# Patient Record
Sex: MALE | Race: WHITE | Employment: FULL TIME | URBAN - METROPOLITAN AREA
[De-identification: names, ages, dates, MRNs, and addresses within clinical notes are randomized per-mention and may not be internally consistent; named-entity substitution may affect disease eponyms.]

---

## 2018-06-12 ENCOUNTER — OFFICE VISIT (OUTPATIENT)
Dept: INTERNAL MEDICINE CLINIC | Age: 23
End: 2018-06-12

## 2018-06-12 VITALS
SYSTOLIC BLOOD PRESSURE: 100 MMHG | BODY MASS INDEX: 23.04 KG/M2 | TEMPERATURE: 98.4 F | RESPIRATION RATE: 16 BRPM | HEART RATE: 84 BPM | OXYGEN SATURATION: 97 % | WEIGHT: 152 LBS | HEIGHT: 68 IN | DIASTOLIC BLOOD PRESSURE: 62 MMHG

## 2018-06-12 DIAGNOSIS — F41.9 ANXIETY: ICD-10-CM

## 2018-06-12 DIAGNOSIS — M25.512 ACUTE PAIN OF LEFT SHOULDER: ICD-10-CM

## 2018-06-12 DIAGNOSIS — R63.4 WEIGHT LOSS: Primary | ICD-10-CM

## 2018-06-12 RX ORDER — DESVENLAFAXINE SUCCINATE 50 MG/1
TABLET, EXTENDED RELEASE ORAL
COMMUNITY
Start: 2018-06-10 | End: 2019-09-17

## 2018-06-12 RX ORDER — AMPHETAMINE SULFATE 10 MG/1
TABLET ORAL
Refills: 0 | COMMUNITY
Start: 2018-05-29 | End: 2019-09-17

## 2018-06-12 RX ORDER — CLONAZEPAM 0.5 MG/1
TABLET ORAL
COMMUNITY

## 2018-06-12 NOTE — MR AVS SNAPSHOT
Aysha Godfrey 
 
 
 [address] [place] [address] [facility] 
[phone] Patient: Mena Rothman MRN: [id number] UWD:[birth year] Visit Information Date & Time Provider Department Dept. Phone Encounter #  
 6/12/2018  1:15 PM Bella Joseph MD [facility] [address] [facility] [id number] Upcoming Health Maintenance Date Due Influenza Age 5 to Adult 8/1/2018 DTaP/Tdap/Td series (2 - Td) 7/26/2027 Allergies as of 6/12/2018  Review Complete On: 7/50/4432 By: Kong Heredia Severity Noted Reaction Type Reactions Other Medication  09/12/2014    Hives Patient reports macrolides Zithromax [Azithromycin]  11/12/2013    Rash Current Immunizations  Never Reviewed Name Date Tdap 7/26/2017 Not reviewed this visit You Were Diagnosed With   
  
 Codes Comments Weight loss    -  Primary ICD-10-CM: R63.4 ICD-9-CM: 783.21 Acute pain of left shoulder     ICD-10-CM: M25.512 ICD-9-CM: 719.41 Vitals BP Pulse Temp Resp Height(growth percentile) Weight(growth percentile) 100/62 (BP 1 Location: Left arm, BP Patient Position: Sitting) 84 98.4 °F (36.9 °C) (Oral) 16 5' 8\" (1.727 m) 152 lb (68.9 kg) SpO2 BMI Smoking Status 97% 23.11 kg/m2 Never Smoker Vitals History BMI and BSA Data Body Mass Index Body Surface Area  
 23.11 kg/m 2 1.82 m 2 Preferred Pharmacy Pharmacy Name Phone [id number] [address] ([place] [address]) [phone] Your Updated Medication List  
  
   
This list is accurate as of 6/12/18  2:11 PM.  Always use your most recent med list.  
  
  
  
  
 albuterol 90 mcg/actuation inhaler Commonly known as:  PROVENTIL HFA, VENTOLIN HFA, PROAIR HFA Take 1 Puff by inhalation every six (6) hours as needed for Wheezing. desvenlafaxine succinate 50 mg ER tablet Commonly known as:  PRISTIQ  
  
 EVEKEO 10 mg Tab Generic drug:  amphetamine sulfate TAKE 1 TABLET BY MOUTH EVERY DAY KlonoPIN 0.5 mg tablet Generic drug:  clonazePAM  
Take  by mouth nightly as needed. SUMAVEL DOSEPRO 6 mg/0.5 mL Nfij Generic drug:  SUMAtriptan Succinate  
1 SQ injection qd prn  
  
 triamcinolone acetonide 0.1 % ointment Commonly known as:  KENALOG Apply  to affected area two (2) times a day. use thin layer ZyrTEC 10 mg tablet Generic drug:  cetirizine Take  by mouth daily. We Performed the Following METABOLIC PANEL, COMPREHENSIVE [69698 CPT(R)]   
 T3 TOTAL [86567 CPT(R)] T4, FREE [id number] CPT(R)] TSH 3RD GENERATION [85177 CPT(R)] Introducing [facility] SERVICES! Leia Smith introduces [employer] patient portal. Now you can access parts of your medical record, email your doctor's office, and request medication refills online. 1. In your internet browser, go to [email or web address] 2. Click on the First Time User? Click Here link in the Sign In box. You will see the New Member Sign Up page. 3. Enter your [employer] Access Code exactly as it appears below. You will not need to use this code after youve completed the sign-up process. If you do not sign up before the expiration date, you must request a new code. · [employer] Access Code: [email or web address] Expires: 9/10/2018  2:11 PM 
 
4. Enter the last four digits of your Social Security Number (xxxx) and Date of Birth (mm/dd/yyyy) as indicated and click Submit. You will be taken to the next sign-up page. 5. Create a [employer] ID. This will be your [employer] login ID and cannot be changed, so think of one that is secure and easy to remember. 6. Create a [employer] password. You can change your password at any time. 7. Enter your Password Reset Question and Answer. This can be used at a later time if you forget your password. 8. Enter your e-mail address. You will receive e-mail notification when new information is available in [address] 9. Click Sign Up. You can now view and download portions of your medical record. 10. Click the Download Summary menu link to download a portable copy of your medical information. If you have questions, please visit the Frequently Asked Questions section of the [employer] website. Remember, [employer] is NOT to be used for urgent needs. For medical emergencies, dial 911. Now available from your iPhone and Android! Please provide this summary of care documentation to your next provider. Your primary care clinician is listed as Monica Read. If you have any questions after today's visit, please call [phone].

## 2018-06-12 NOTE — PROGRESS NOTES
Macario Stewart is a 25 y.o. male who presents today for Thyroid Problem; Fatigue; and Shoulder Pain  . He has a history of   Patient Active Problem List   Diagnosis Code    Migraine with aura, without mention of intractable migraine without mention of status migrainosus G43.109   . Today patient is here for an acute visit. he does not have other concerns. Problem visit:  Macario Stewart is here for complaint of excessive hunger. .  Problem began 2 month(s) ago. Severity is moderate  Character of problem: feels like he cannot keep up his weight up without eating. Has been worse recently. Has been taking less Evekeo recently. Family history of ? Thyroid issue. No abd pain. Some increased stress. No C/D. Musculoskeletal pain:  He wishes to address discomfort in the Left shoulder at today's visit. This has been mild in nature, sudden, starting about 1 year ago in onset. Comes and goes and notices more when active. Sore to middle of shoulder. Pain then worse with use. Carries a bag on L shoulder. No trauma. Still seeing Dr. Samantha Thompson. Overall stress is up a bit, but anxiety under reasonable control. ROS  Review of Systems   Constitutional: Positive for weight loss. Negative for chills, fever and malaise/fatigue. HENT: Negative for ear discharge, ear pain, hearing loss, nosebleeds and tinnitus. Eyes: Negative for blurred vision and double vision. Respiratory: Negative for cough, hemoptysis, sputum production, shortness of breath and wheezing. Cardiovascular: Negative for chest pain, palpitations, orthopnea, claudication and leg swelling. Gastrointestinal: Negative for abdominal pain, blood in stool, constipation, diarrhea, heartburn, nausea and vomiting. Always hungry. Genitourinary: Negative for dysuria, frequency, hematuria and urgency. Musculoskeletal: Positive for joint pain. Negative for back pain, myalgias and neck pain.    Skin: Negative for itching. Neurological: Negative. Endo/Heme/Allergies: Does not bruise/bleed easily. Psychiatric/Behavioral: Negative for depression, hallucinations, memory loss, substance abuse and suicidal ideas. The patient is nervous/anxious and has insomnia. Visit Vitals    /62 (BP 1 Location: Left arm, BP Patient Position: Sitting)    Pulse 84    Temp 98.4 °F (36.9 °C) (Oral)    Resp 16    Ht 5' 8\" (1.727 m)    Wt 152 lb (68.9 kg)    SpO2 97%    BMI 23.11 kg/m2       Physical Exam   Constitutional: He is oriented to person, place, and time. He appears well-developed and well-nourished. HENT:   Head: Normocephalic and atraumatic. Eyes: EOM are normal. Pupils are equal, round, and reactive to light. Cardiovascular: Normal rate and regular rhythm. No murmur heard. Pulmonary/Chest: Effort normal and breath sounds normal. No respiratory distress. Musculoskeletal: He exhibits no edema. Normal ROM of shoulder. No pain with resisted rotation. Neurological: He is alert and oriented to person, place, and time. Skin: Skin is warm and dry. He is not diaphoretic. Psychiatric: He has a normal mood and affect. His behavior is normal.         Current Outpatient Prescriptions   Medication Sig    EVEKEO 10 mg tab TAKE 1 TABLET BY MOUTH EVERY DAY    desvenlafaxine succinate (PRISTIQ) 50 mg ER tablet     clonazePAM (KLONOPIN) 0.5 mg tablet Take  by mouth nightly as needed.  triamcinolone acetonide (KENALOG) 0.1 % ointment Apply  to affected area two (2) times a day. use thin layer    albuterol (PROVENTIL HFA, VENTOLIN HFA, PROAIR HFA) 90 mcg/actuation inhaler Take 1 Puff by inhalation every six (6) hours as needed for Wheezing.  SUMAVEL DOSEPRO 6 mg/0.5 mL nfIj 1 SQ injection qd prn    cetirizine (ZYRTEC) 10 mg tablet Take  by mouth daily. No current facility-administered medications for this visit.          Past Medical History:   Diagnosis Date    ADHD (attention deficit hyperactivity disorder)     Anxiety     AR (allergic rhinitis)     Asthma     Depression     Headache     N&V (nausea and vomiting)     Poor appetite     Visual disturbance       History reviewed. No pertinent surgical history. Social History   Substance Use Topics    Smoking status: Never Smoker    Smokeless tobacco: Never Used    Alcohol use No      Family History   Problem Relation Age of Onset    Headache Maternal Aunt     Headache Maternal Grandmother     Heart Disease Maternal Grandmother     Stroke Maternal Grandmother     Cancer Maternal Grandfather     Dementia Other         Allergies   Allergen Reactions    Other Medication Hives     Patient reports macrolides    Zithromax [Azithromycin] Rash        Assessment/Plan  Diagnoses and all orders for this visit:    1. Weight loss - weight here stable. + family history of thyroid issue. Notes having to eat more than he would expect. Will check thyroid. May also be due to less amphetamines recently.   -     TSH 3RD GENERATION  -     T4, FREE  -     T3 TOTAL  -     METABOLIC PANEL, COMPREHENSIVE    2. Acute pain of left shoulder - subacute. Likely overuse injury. Limit wearing bag on that side. PRN NSAIDs. 3. Anxiety - stable. Seeing psych.          Follow-up Disposition: Not on File    Ayde Martino MD  6/12/2018

## 2018-06-13 DIAGNOSIS — R74.01 ELEVATED ALT MEASUREMENT: Primary | ICD-10-CM

## 2018-06-13 LAB
ALBUMIN SERPL-MCNC: 5 G/DL (ref 3.5–5.5)
ALBUMIN/GLOB SERPL: 2.3 {RATIO} (ref 1.2–2.2)
ALP SERPL-CCNC: 74 IU/L (ref 39–117)
ALT SERPL-CCNC: 97 IU/L (ref 0–44)
AST SERPL-CCNC: 38 IU/L (ref 0–40)
BILIRUB SERPL-MCNC: 0.7 MG/DL (ref 0–1.2)
BUN SERPL-MCNC: 11 MG/DL (ref 6–20)
BUN/CREAT SERPL: 14 (ref 9–20)
CALCIUM SERPL-MCNC: 9.6 MG/DL (ref 8.7–10.2)
CHLORIDE SERPL-SCNC: 100 MMOL/L (ref 96–106)
CO2 SERPL-SCNC: 28 MMOL/L (ref 20–29)
CREAT SERPL-MCNC: 0.8 MG/DL (ref 0.76–1.27)
GFR SERPLBLD CREATININE-BSD FMLA CKD-EPI: 127 ML/MIN/1.73
GFR SERPLBLD CREATININE-BSD FMLA CKD-EPI: 147 ML/MIN/1.73
GLOBULIN SER CALC-MCNC: 2.2 G/DL (ref 1.5–4.5)
GLUCOSE SERPL-MCNC: 76 MG/DL (ref 65–99)
POTASSIUM SERPL-SCNC: 4.5 MMOL/L (ref 3.5–5.2)
PROT SERPL-MCNC: 7.2 G/DL (ref 6–8.5)
SODIUM SERPL-SCNC: 144 MMOL/L (ref 134–144)
T3 SERPL-MCNC: 114 NG/DL (ref 71–180)
T4 FREE SERPL-MCNC: 1.38 NG/DL (ref 0.82–1.77)
TSH SERPL DL<=0.005 MIU/L-ACNC: 1.93 UIU/ML (ref 0.45–4.5)

## 2018-07-10 ENCOUNTER — OFFICE VISIT (OUTPATIENT)
Dept: INTERNAL MEDICINE CLINIC | Age: 23
End: 2018-07-10

## 2018-07-10 VITALS
TEMPERATURE: 98.3 F | BODY MASS INDEX: 22.88 KG/M2 | RESPIRATION RATE: 16 BRPM | HEART RATE: 79 BPM | OXYGEN SATURATION: 97 % | WEIGHT: 151 LBS | SYSTOLIC BLOOD PRESSURE: 105 MMHG | DIASTOLIC BLOOD PRESSURE: 64 MMHG | HEIGHT: 68 IN

## 2018-07-10 DIAGNOSIS — J40 BRONCHITIS: Primary | ICD-10-CM

## 2018-07-10 RX ORDER — DOXYCYCLINE 100 MG/1
100 TABLET ORAL 2 TIMES DAILY
Qty: 14 TAB | Refills: 0 | Status: SHIPPED | OUTPATIENT
Start: 2018-07-10 | End: 2019-09-17

## 2018-07-10 NOTE — PROGRESS NOTES
HISTORY OF PRESENT ILLNESS  Víctor Crocker is a 25 y.o. male. HPI  Upper respiratory illness:  Víctor Crocker presents with complaints of sore throat, post nasal drip and cough described as productive of thick yellow-green sputum for 2 weeks. no nausea and no vomiting . he has not had  myalgias, fever, chills and shortness of breath. Symptoms are moderate. Over-the-counter remedies   has never been tried. Drinking plenty of fluids: yes  Asthma?:  no  non-smoker  Contacts with similar infections: yes -- his girlfriend was recently ill with similar symptoms and has improved with antibiotics        Review of Systems   Constitutional: Positive for malaise/fatigue. Negative for chills and fever. HENT: Positive for sore throat. Negative for congestion. Respiratory: Positive for cough and sputum production. Negative for shortness of breath and wheezing. Cardiovascular: Negative for chest pain and palpitations. Gastrointestinal: Negative for nausea and vomiting. Genitourinary: Negative for dysuria and frequency. Musculoskeletal: Negative for myalgias. Neurological: Negative for dizziness and headaches. /64 (BP 1 Location: Left arm, BP Patient Position: Sitting)  Pulse 79  Temp 98.3 °F (36.8 °C) (Oral)   Resp 16  Ht 5' 8\" (1.727 m)  Wt 151 lb (68.5 kg)  SpO2 97%  BMI 22.96 kg/m2  Physical Exam   Constitutional: He is oriented to person, place, and time. He appears well-developed and well-nourished. No distress. HENT:   Head: Normocephalic and atraumatic. Right Ear: External ear normal.   Left Ear: External ear normal.   Nose: Nose normal.   Mouth/Throat: Posterior oropharyngeal erythema (mild) present. Neck: Normal range of motion. Neck supple. No thyromegaly present. Cardiovascular: Normal rate and regular rhythm. Pulmonary/Chest: Effort normal. He has no wheezes. He has no rales.    Coarse upper airway congestion   Lymphadenopathy:     He has no cervical adenopathy. Neurological: He is alert and oriented to person, place, and time. Skin: Skin is warm and dry. Psychiatric: He has a normal mood and affect. His behavior is normal.   Nursing note and vitals reviewed. ASSESSMENT and PLAN  Diagnoses and all orders for this visit:    1. Bronchitis -- has been prolonged without improvement; cautioned regarding sensitivity to sun while on Doxycycline  -     doxycycline (ADOXA) 100 mg tablet; Take 1 Tab by mouth two (2) times a day. -     guaiFENesin-dextromethorphan SR (MUCINEX DM) 600-30 mg per tablet; Take 1 Tab by mouth every twelve (12) hours as needed for Cough.       reviewed diet, exercise and weight control  reviewed medications and side effects in detail

## 2018-07-10 NOTE — PATIENT INSTRUCTIONS
Bronchitis: Care Instructions  Your Care Instructions    Bronchitis is inflammation of the bronchial tubes, which carry air to the lungs. The tubes swell and produce mucus, or phlegm. The mucus and inflamed bronchial tubes make you cough. You may have trouble breathing. Most cases of bronchitis are caused by viruses like those that cause colds. Antibiotics usually do not help and they may be harmful. Bronchitis usually develops rapidly and lasts about 2 to 3 weeks in otherwise healthy people. Follow-up care is a key part of your treatment and safety. Be sure to make and go to all appointments, and call your doctor if you are having problems. It's also a good idea to know your test results and keep a list of the medicines you take. How can you care for yourself at home? · Take all medicines exactly as prescribed. Call your doctor if you think you are having a problem with your medicine. · Get some extra rest.  · Take an over-the-counter pain medicine, such as acetaminophen (Tylenol), ibuprofen (Advil, Motrin), or naproxen (Aleve) to reduce fever and relieve body aches. Read and follow all instructions on the label. · Do not take two or more pain medicines at the same time unless the doctor told you to. Many pain medicines have acetaminophen, which is Tylenol. Too much acetaminophen (Tylenol) can be harmful. · Take an over-the-counter cough medicine that contains dextromethorphan to help quiet a dry, hacking cough so that you can sleep. Avoid cough medicines that have more than one active ingredient. Read and follow all instructions on the label. · Breathe moist air from a humidifier, hot shower, or sink filled with hot water. The heat and moisture will thin mucus so you can cough it out. · Do not smoke. Smoking can make bronchitis worse. If you need help quitting, talk to your doctor about stop-smoking programs and medicines. These can increase your chances of quitting for good.   When should you call for help? Call 911 anytime you think you may need emergency care. For example, call if:    · You have severe trouble breathing.    Call your doctor now or seek immediate medical care if:    · You have new or worse trouble breathing.     · You cough up dark brown or bloody mucus (sputum).     · You have a new or higher fever.     · You have a new rash.    Watch closely for changes in your health, and be sure to contact your doctor if:    · You cough more deeply or more often, especially if you notice more mucus or a change in the color of your mucus.     · You are not getting better as expected. Where can you learn more? Go to http://salas-mira.info/. Enter H333 in the search box to learn more about \"Bronchitis: Care Instructions. \"  Current as of: December 6, 2017  Content Version: 11.7  © 3703-9223 [employer]. Care instructions adapted under license by [employer] (which disclaims liability or warranty for this information). If you have questions about a medical condition or this instruction, always ask your healthcare professional. [address] any warranty or liability for your use of this information.

## 2019-09-17 ENCOUNTER — OFFICE VISIT (OUTPATIENT)
Dept: INTERNAL MEDICINE CLINIC | Age: 24
End: 2019-09-17

## 2019-09-17 VITALS
BODY MASS INDEX: 22.28 KG/M2 | RESPIRATION RATE: 16 BRPM | OXYGEN SATURATION: 98 % | HEIGHT: 68 IN | HEART RATE: 88 BPM | WEIGHT: 147 LBS | SYSTOLIC BLOOD PRESSURE: 96 MMHG | TEMPERATURE: 98 F | DIASTOLIC BLOOD PRESSURE: 70 MMHG

## 2019-09-17 DIAGNOSIS — N41.0 ACUTE PROSTATITIS: Primary | ICD-10-CM

## 2019-09-17 DIAGNOSIS — R30.9 URINARY PAIN: ICD-10-CM

## 2019-09-17 LAB
BILIRUB UR QL STRIP: NEGATIVE
GLUCOSE UR-MCNC: NEGATIVE MG/DL
KETONES P FAST UR STRIP-MCNC: NEGATIVE MG/DL
PH UR STRIP: 8 [PH] (ref 4.6–8)
PROT UR QL STRIP: NORMAL
SP GR UR STRIP: 1.02 (ref 1–1.03)
UA UROBILINOGEN AMB POC: NORMAL (ref 0.2–1)
URINALYSIS CLARITY POC: CLEAR
URINALYSIS COLOR POC: YELLOW
URINE BLOOD POC: NEGATIVE
URINE LEUKOCYTES POC: NEGATIVE
URINE NITRITES POC: NEGATIVE

## 2019-09-17 RX ORDER — ATOMOXETINE 18 MG/1
18 CAPSULE ORAL DAILY
COMMUNITY

## 2019-09-17 RX ORDER — MIRTAZAPINE 7.5 MG/1
TABLET, FILM COATED ORAL
Refills: 0 | COMMUNITY
Start: 2019-09-08

## 2019-09-17 RX ORDER — FLUOXETINE HYDROCHLORIDE 40 MG/1
CAPSULE ORAL
Refills: 0 | COMMUNITY
Start: 2019-08-28

## 2019-09-17 RX ORDER — HYDROXYZINE 25 MG/1
TABLET, FILM COATED ORAL
Refills: 0 | COMMUNITY
Start: 2019-09-13

## 2019-09-17 NOTE — PROGRESS NOTES
Sylvia Foster is a 25 y.o. male who presents today for Urinary Burning and Anal Pain  . He has a history of   Patient Active Problem List   Diagnosis Code    Migraine with aura, without mention of intractable migraine without mention of status migrainosus G43.109   . Today patient is here for an acute visit. .     Problem visit:  Sylvia Foster is here for complaint of dysuria and rectal pain. Problem began 1 week(s) ago. Severity is moderate  Character of problem: Rectal pain and mild dyscomfort. Urinating/ejaculation makes the problem worse. She was one partner with whom he is monogamous. She does not have any symptoms. He denies any discharge. Denies any fevers or chills or other systemic signs of infection. He does note since last night this morning is been feeling much better and has not had any more symptoms. Urinalysis in the office only positive for protein. Denies any recent changes in medications. ROS  Review of Systems   Constitutional: Negative for chills, fever and weight loss. HENT: Negative for congestion and sore throat. Eyes: Negative for blurred vision, double vision and photophobia. Respiratory: Negative for cough and shortness of breath. Cardiovascular: Negative for chest pain, palpitations and leg swelling. Gastrointestinal: Negative for abdominal pain, constipation, diarrhea, heartburn, nausea and vomiting. Genitourinary: Positive for dysuria. Negative for frequency and urgency. Symptoms have resolved since yesterday. Musculoskeletal: Negative for joint pain and myalgias. Skin: Negative for rash. Neurological: Negative. Negative for headaches. Endo/Heme/Allergies: Does not bruise/bleed easily. Psychiatric/Behavioral: Negative for memory loss and suicidal ideas.        Visit Vitals  BP 96/70 (BP 1 Location: Left arm, BP Patient Position: Sitting)   Pulse 88   Temp 98 °F (36.7 °C) (Oral)   Resp 16   Ht 5' 8\" (1.727 m)   Wt 147 lb (66.7 kg)   SpO2 98%   BMI 22.35 kg/m²       Physical Exam   Constitutional: He is oriented to person, place, and time. He appears well-developed and well-nourished. HENT:   Head: Normocephalic and atraumatic. Right Ear: External ear normal.   Left Ear: External ear normal.   Eyes: Pupils are equal, round, and reactive to light. EOM are normal.   Neck: Normal range of motion. Neck supple. No JVD present. Cardiovascular: Normal rate and regular rhythm. No murmur heard. Pulmonary/Chest: Effort normal and breath sounds normal. No respiratory distress. He has no wheezes. Abdominal: Soft. Bowel sounds are normal. He exhibits no distension. There is no tenderness. Musculoskeletal: Normal range of motion. He exhibits no edema. Neurological: He is alert and oriented to person, place, and time. No cranial nerve deficit. Skin: Skin is warm and dry. He is not diaphoretic. Psychiatric: He has a normal mood and affect. His behavior is normal.         Current Outpatient Medications   Medication Sig    FLUoxetine (PROZAC) 40 mg capsule     hydrOXYzine HCl (ATARAX) 25 mg tablet take 1 tablet by mouth three times a day if needed for anxiety    mirtazapine (REMERON) 7.5 mg tablet     atomoxetine (STRATTERA) 18 mg capsule Take 18 mg by mouth daily.  clonazePAM (KLONOPIN) 0.5 mg tablet Take  by mouth nightly as needed.  albuterol (PROVENTIL HFA, VENTOLIN HFA, PROAIR HFA) 90 mcg/actuation inhaler Take 1 Puff by inhalation every six (6) hours as needed for Wheezing.  cetirizine (ZYRTEC) 10 mg tablet Take  by mouth daily. No current facility-administered medications for this visit. Past Medical History:   Diagnosis Date    ADHD (attention deficit hyperactivity disorder)     Anxiety     AR (allergic rhinitis)     Asthma     Depression     Headache     N&V (nausea and vomiting)     Poor appetite     Visual disturbance       History reviewed. No pertinent surgical history.    Social History Tobacco Use    Smoking status: Never Smoker    Smokeless tobacco: Never Used   Substance Use Topics    Alcohol use: No      Family History   Problem Relation Age of Onset    Headache Maternal Aunt     Headache Maternal Grandmother     Heart Disease Maternal Grandmother     Stroke Maternal Grandmother     Cancer Maternal Grandfather     Dementia Other         Allergies   Allergen Reactions    Other Medication Hives     Patient reports macrolides    Zithromax [Azithromycin] Rash        Assessment/Plan  Diagnoses and all orders for this visit:    1. Acute prostatitis -likely prostatitis have resolved. Will send urine for microscopy but his symptoms have resolved no further evaluation needed. If symptoms recur we will treat for prostatitis with Cipro. -     URINALYSIS W/ RFLX MICROSCOPIC    2. Urinary pain  -     AMB POC URINALYSIS DIP STICK AUTO W/O MICRO        Follow-up and Dispositions    · Return if symptoms worsen or fail to improve. Dwayne Beltran MD  9/17/2019    This note was created with the help of speech recognition software Ebonie Jennings) and may contain some 'sound alike' errors.

## 2019-09-17 NOTE — PATIENT INSTRUCTIONS
Prostatitis: Care Instructions  Your Care Instructions    The prostate gland is a small, walnut-shaped organ. It lies just below a man's bladder. It surrounds the urethra, the tube that carries urine through the penis and out of the body. Prostatitis is a painful condition caused by inflammation or infection of the prostate gland. Sometimes the condition is caused by bacteria, but often the cause is not known. Prostatitis caused by bacteria usually is treated with self-care and antibiotics. Follow-up care is a key part of your treatment and safety. Be sure to make and go to all appointments, and call your doctor if you are having problems. It's also a good idea to know your test results and keep a list of the medicines you take. How can you care for yourself at home? · If your doctor prescribed antibiotics, take them as directed. Do not stop taking them just because you feel better. You need to take the full course of antibiotics. · Take an over-the-counter pain medicine, such as acetaminophen (Tylenol), ibuprofen (Advil, Motrin), or naproxen (Aleve). Be safe with medicines. Read and follow all instructions on the label. · Take warm baths to help soothe pain. · Straining to pass stools can hurt when your prostate is inflamed. Avoid constipation. ? Include fruits, vegetables, beans, and whole grains in your diet each day. These foods are high in fiber. ? Drink plenty of fluids, enough so that your urine is light yellow or clear like water. If you have kidney, heart, or liver disease and have to limit fluids, talk with your doctor before you increase the amount of fluids you drink. ? Get some exercise every day. Build up slowly to 30 to 60 minutes a day on 5 or more days of the week. ? Take a fiber supplement, such as Citrucel or Metamucil, every day if needed. Read and follow all instructions on the label. ? Schedule time each day for a bowel movement. Having a daily routine may help.  Take your time and do not strain when having a bowel movement. · Avoid alcohol, caffeine, and spicy foods, especially if they make your symptoms worse. When should you call for help? Call your doctor now or seek immediate medical care if:    · You have symptoms of a urinary tract infection. These may include:  ? Pain or burning when you urinate. ? A frequent need to urinate without being able to pass much urine. ? Pain in the flank, which is just below the rib cage and above the waist on either side of the back. ? Blood in your urine. ? A fever.    Watch closely for changes in your health, and be sure to contact your doctor if:    · You cannot empty your bladder completely.     · You do not get better as expected. Where can you learn more? Go to http://salas-mira.info/. Enter J010 in the search box to learn more about \"Prostatitis: Care Instructions. \"  Current as of: September 26, 2018  Content Version: 12.1  © 6620-1360 [employer]. Care instructions adapted under license by [employer] (which disclaims liability or warranty for this information). If you have questions about a medical condition or this instruction, always ask your healthcare professional. [address] any warranty or liability for your use of this information.

## 2019-09-18 LAB
APPEARANCE UR: ABNORMAL
BACTERIA #/AREA URNS HPF: ABNORMAL /[HPF]
BILIRUB UR QL STRIP: NEGATIVE
CASTS URNS QL MICRO: ABNORMAL /LPF
COLOR UR: YELLOW
CRYSTALS URNS MICRO: ABNORMAL
EPI CELLS #/AREA URNS HPF: ABNORMAL /HPF (ref 0–10)
GLUCOSE UR QL: NEGATIVE
HGB UR QL STRIP: NEGATIVE
KETONES UR QL STRIP: NEGATIVE
LEUKOCYTE ESTERASE UR QL STRIP: NEGATIVE
MICRO URNS: ABNORMAL
MUCOUS THREADS URNS QL MICRO: PRESENT
NITRITE UR QL STRIP: NEGATIVE
PH UR STRIP: 8 [PH] (ref 5–7.5)
PROT UR QL STRIP: ABNORMAL
RBC #/AREA URNS HPF: ABNORMAL /HPF (ref 0–2)
SP GR UR: 1.02 (ref 1–1.03)
UNIDENT CRYS URNS QL MICRO: PRESENT
UROBILINOGEN UR STRIP-MCNC: 0.2 MG/DL (ref 0.2–1)
WBC #/AREA URNS HPF: ABNORMAL /HPF (ref 0–5)

## 2020-10-13 ENCOUNTER — OFFICE VISIT (OUTPATIENT)
Dept: INTERNAL MEDICINE CLINIC | Age: 25
End: 2020-10-13

## 2020-10-13 VITALS
DIASTOLIC BLOOD PRESSURE: 84 MMHG | BODY MASS INDEX: 27.95 KG/M2 | RESPIRATION RATE: 22 BRPM | OXYGEN SATURATION: 97 % | TEMPERATURE: 98.4 F | WEIGHT: 183.8 LBS | HEART RATE: 96 BPM | SYSTOLIC BLOOD PRESSURE: 127 MMHG

## 2020-10-13 DIAGNOSIS — F32.A ANXIETY AND DEPRESSION: ICD-10-CM

## 2020-10-13 DIAGNOSIS — R82.90 CLOUDY URINE: Primary | ICD-10-CM

## 2020-10-13 DIAGNOSIS — F98.8 ATTENTION DEFICIT DISORDER, UNSPECIFIED HYPERACTIVITY PRESENCE: ICD-10-CM

## 2020-10-13 DIAGNOSIS — F41.9 ANXIETY AND DEPRESSION: ICD-10-CM

## 2020-10-13 LAB
BILIRUB UR QL STRIP: NEGATIVE
GLUCOSE UR-MCNC: NEGATIVE MG/DL
KETONES P FAST UR STRIP-MCNC: NEGATIVE MG/DL
PH UR STRIP: 6 [PH] (ref 4.6–8)
PROT UR QL STRIP: NORMAL
SP GR UR STRIP: 1.03 (ref 1–1.03)
UA UROBILINOGEN AMB POC: NORMAL (ref 0.2–1)
URINALYSIS CLARITY POC: CLEAR
URINALYSIS COLOR POC: NORMAL
URINE BLOOD POC: NEGATIVE
URINE LEUKOCYTES POC: NEGATIVE
URINE NITRITES POC: NEGATIVE

## 2020-10-13 PROCEDURE — 81001 URINALYSIS AUTO W/SCOPE: CPT | Performed by: INTERNAL MEDICINE

## 2020-10-13 PROCEDURE — 99214 OFFICE O/P EST MOD 30 MIN: CPT | Performed by: INTERNAL MEDICINE

## 2020-10-13 NOTE — PROGRESS NOTES
HISTORY OF PRESENT ILLNESS  Taz Tompkins is a 22 y.o. male. HPI  Cloudy urine: Pt reports that 2 weeks ago he started having dark, odorous, cloudy urine with no dysuria. Pt notes that he has had mild inflammation of his prostate in the past. He states that he has decreased his caffeine intake. Pt notes that he is drinking more water. He reports that he is exercising more and increasing his protein intake. He notes decreased libido due to medication compliance. ADD: Stable, pt continues to comply with Remeron. Pt reports that he has increased appetite due to Remeron so he is taking it later at night to prevent bouts of hunger. Mood: Stable, pt continues to comply with Prozac. Review of Systems   Genitourinary: Negative for dysuria. Dark odorous urine   All other systems reviewed and are negative. Physical Exam  Vitals signs and nursing note reviewed. Constitutional:       Appearance: Normal appearance. He is normal weight. HENT:      Head: Normocephalic and atraumatic. Right Ear: Tympanic membrane, ear canal and external ear normal.      Left Ear: Tympanic membrane, ear canal and external ear normal.      Nose: Nose normal.      Mouth/Throat:      Mouth: Mucous membranes are dry. Pharynx: Oropharynx is clear. Neck:      Musculoskeletal: Normal range of motion and neck supple. Cardiovascular:      Rate and Rhythm: Normal rate and regular rhythm. Pulses: Normal pulses. Heart sounds: Normal heart sounds. Pulmonary:      Effort: Pulmonary effort is normal.      Breath sounds: Normal breath sounds. Abdominal:      General: Abdomen is flat. Palpations: Abdomen is soft. Musculoskeletal: Normal range of motion. Skin:     General: Skin is warm and dry. Neurological:      General: No focal deficit present. Mental Status: He is alert and oriented to person, place, and time. Mental status is at baseline.    Psychiatric:         Mood and Affect: Mood normal.         Behavior: Behavior normal.         Thought Content: Thought content normal.         Judgment: Judgment normal.         ASSESSMENT and PLAN  Diagnoses and all orders for this visit:    1. Cloudy urine  Informed pt that his urinalysis showed no nitrates or leukocytes. Assured pt that I do not suspect UTI. Discussed with pt that his urinalysis did indicate some protein in his urine. Will send urine for culture for further evaluation. Asked pt to make f/u appt to discuss protein in his urine. Will continue to monitor for improvements or changes. 2. Attention deficit disorder, unspecified hyperactivity presence  Stable and well-managed. No change in medications. 3. Anxiety and depression  Stable and well-managed. No change in medications. Lab results and schedule of future lab studies reviewed with patient. Reviewed diet, exercise and weight control. Written by Wilman Diana, as dictated by Kiki Olivarez MD.     Current diagnosis and concerns discussed with pt at length. Understands risks and benefits or current treatment plan and medications and accepts the treatment and medication with any possible risks. Pt asks appropriate questions which were answered. Pt instructed to call with any concerns or problems.

## 2020-10-15 LAB
BACTERIA SPEC CULT: NORMAL
SERVICE CMNT-IMP: NORMAL

## 2020-10-19 ENCOUNTER — TELEPHONE (OUTPATIENT)
Dept: INTERNAL MEDICINE CLINIC | Age: 25
End: 2020-10-19

## 2020-10-19 NOTE — TELEPHONE ENCOUNTER
----- Message from Peyton Pabon MD sent at 10/17/2020  7:09 AM EDT -----  Please let him know urine is normal

## 2021-04-09 NOTE — MR AVS SNAPSHOT
[facility] 
 
 
 [address] 
[phone] Patient: Salvatore Murray MRN: [id number] FLOYD:[id number] Visit Information Date & Time Provider Department Dept. Phone Encounter #  
 7/10/2018  1:40 PM Sherie Garrison NP [facility] [id number] Upcoming Health Maintenance Date Due Influenza Age 5 to Adult 8/1/2018 DTaP/Tdap/Td series (2 - Td) 7/26/2027 Allergies as of 7/10/2018  Review Complete On: 7/10/2018 By: Sherie Garrison NP Severity Noted Reaction Type Reactions Other Medication  09/12/2014    Hives Patient reports macrolides Zithromax [Azithromycin]  11/12/2013    Rash Current Immunizations  Never Reviewed Name Date Tdap 7/26/2017 Not reviewed this visit You Were Diagnosed With   
  
 Codes Comments Bronchitis    -  Primary ICD-10-CM: C16 ICD-9-CM: 547 Vitals BP Pulse Temp Resp Height(growth percentile) Weight(growth percentile) 105/64 (BP 1 Location: Left arm, BP Patient Position: Sitting) 79 98.3 °F (36.8 °C) (Oral) 16 5' 8\" (1.727 m) 151 lb (68.5 kg) SpO2 BMI Smoking Status 97% 22.96 kg/m2 Never Smoker Vitals History BMI and BSA Data Body Mass Index Body Surface Area  
 22.96 kg/m 2 1.81 m 2 Preferred Pharmacy Pharmacy Name Phone [facility]/PHARMACY #[id number]- [place], [address] [place]) [phone] Your Updated Medication List  
  
   
This list is accurate as of 7/10/18  2:18 PM.  Always use your most recent med list.  
  
  
  
  
 albuterol 90 mcg/actuation inhaler Commonly known as:  PROVENTIL HFA, VENTOLIN HFA, PROAIR HFA Take 1 Puff by inhalation every six (6) hours as needed for Wheezing. desvenlafaxine succinate 50 mg ER tablet Commonly known as:  PRISTIQ  
  
 doxycycline 100 mg tablet ENDY notes patient accepted at [facility] for LTC for IV ABX. Patient can go to room 4. Call report to [phone]. ENDY spoke with Dr. Korin Livingston and he states patient remains cleared for discharge and gives okay for updated discharge order to be put in. ENDY has entered updated D/C order into epic. ENDY has reported to patient and he is pleased. ENDY has completed discharge checklist and placed on front of chart. ENDY has reported findings to nurse, Domingo Eaton. Discharge plan of care/case management plan validated with provider discharge order. ENDY has scheduled transportation via [address] at [phone] with Trip ID [id number]. Patient able to ambulate with assistance and can go by cab. Commonly known as:  ADOXA Take 1 Tab by mouth two (2) times a day. EVEKEO 10 mg Tab Generic drug:  amphetamine sulfate TAKE 1 TABLET BY MOUTH EVERY DAY  
  
 guaiFENesin-dextromethorphan -30 mg per tablet Commonly known as:  Ace & Ace DM Take 1 Tab by mouth every twelve (12) hours as needed for Cough. KlonoPIN 0.5 mg tablet Generic drug:  clonazePAM  
Take  by mouth nightly as needed. SUMAVEL DOSEPRO 6 mg/0.5 mL Nfij Generic drug:  SUMAtriptan Succinate  
1 SQ injection qd prn  
  
 triamcinolone acetonide 0.1 % ointment Commonly known as:  KENALOG Apply  to affected area two (2) times a day. use thin layer ZyrTEC 10 mg tablet Generic drug:  cetirizine Take  by mouth daily. Prescriptions Sent to Pharmacy Refills  
 doxycycline (ADOXA) 100 mg tablet 0 Sig: Take 1 Tab by mouth two (2) times a day. Class: Normal  
 Pharmacy: [facility]/pharmacy #[id number]- [address], [address] ([place] [address]) Ph #: [phone] Route: Oral  
  
Patient Instructions Bronchitis: Care Instructions Your Care Instructions Bronchitis is inflammation of the bronchial tubes, which carry air to the lungs. The tubes swell and produce mucus, or phlegm. The mucus and inflamed bronchial tubes make you cough. You may have trouble breathing. Most cases of bronchitis are caused by viruses like those that cause colds. Antibiotics usually do not help and they may be harmful. Bronchitis usually develops rapidly and lasts about 2 to 3 weeks in otherwise healthy people. Follow-up care is a key part of your treatment and safety. Be sure to make and go to all appointments, and call your doctor if you are having problems. It's also a good idea to know your test results and keep a list of the medicines you take. How can you care for yourself at home? · Take all medicines exactly as prescribed.  Call your doctor if you think you are having a problem with your medicine. · Get some extra rest. 
· Take an over-the-counter pain medicine, such as acetaminophen (Tylenol), ibuprofen (Advil, Motrin), or naproxen (Aleve) to reduce fever and relieve body aches. Read and follow all instructions on the label. · Do not take two or more pain medicines at the same time unless the doctor told you to. Many pain medicines have acetaminophen, which is Tylenol. Too much acetaminophen (Tylenol) can be harmful. · Take an over-the-counter cough medicine that contains dextromethorphan to help quiet a dry, hacking cough so that you can sleep. Avoid cough medicines that have more than one active ingredient. Read and follow all instructions on the label. · Breathe moist air from a humidifier, hot shower, or sink filled with hot water. The heat and moisture will thin mucus so you can cough it out. · Do not smoke. Smoking can make bronchitis worse. If you need help quitting, talk to your doctor about stop-smoking programs and medicines. These can increase your chances of quitting for good. When should you call for help? Call 911 anytime you think you may need emergency care. For example, call if: 
  · You have severe trouble breathing.  
 Call your doctor now or seek immediate medical care if: 
  · You have new or worse trouble breathing.  
  · You cough up dark brown or bloody mucus (sputum).  
  · You have a new or higher fever.  
  · You have a new rash.  
 Watch closely for changes in your health, and be sure to contact your doctor if: 
  · You cough more deeply or more often, especially if you notice more mucus or a change in the color of your mucus.  
  · You are not getting better as expected. Where can you learn more? Go to http://salas-mira.info/. Enter H333 in the search box to learn more about \"Bronchitis: Care Instructions. \" Current as of: December 6, 2017 Content Version: 11.7 © 3327-4409 Healthwise, Incorporated. Care instructions adapted under license by [employer] (which disclaims liability or warranty for this information). If you have questions about a medical condition or this instruction, always ask your healthcare professional. [address] any warranty or liability for your use of this information. Introducing [facility]! Dear Kory Host: Thank you for requesting a Kannuu account. Our records indicate that you already have an active Kannuu account. You can access your account anytime at [email or web address] Did you know that you can access your hospital and ER discharge instructions at any time in Kannuu? You can also review all of your test results from your hospital stay or ER visit. Additional Information If you have questions, please visit the Frequently Asked Questions section of the Kannuu website at [email or web address] Remember, Kannuu is NOT to be used for urgent needs. For medical emergencies, dial 911. Now available from your iPhone and Android! Please provide this summary of care documentation to your next provider. Your primary care clinician is listed as Quincy Romelia. If you have any questions after today's visit, please call [phone].

## 2021-10-21 ENCOUNTER — HOSPITAL ENCOUNTER (EMERGENCY)
Age: 26
Discharge: HOME | End: 2021-10-21
Attending: EMERGENCY MEDICINE
Payer: MEDICAID

## 2021-10-21 ENCOUNTER — APPOINTMENT (OUTPATIENT)
Dept: CT IMAGING | Age: 26
End: 2021-10-21
Attending: EMERGENCY MEDICINE
Payer: MEDICAID

## 2021-10-21 VITALS
RESPIRATION RATE: 18 BRPM | OXYGEN SATURATION: 100 % | HEIGHT: 68 IN | DIASTOLIC BLOOD PRESSURE: 78 MMHG | BODY MASS INDEX: 21.98 KG/M2 | WEIGHT: 145 LBS | TEMPERATURE: 97.8 F | SYSTOLIC BLOOD PRESSURE: 136 MMHG | HEART RATE: 88 BPM

## 2021-10-21 DIAGNOSIS — N20.0 KIDNEY STONE: ICD-10-CM

## 2021-10-21 DIAGNOSIS — K59.00 CONSTIPATION, UNSPECIFIED CONSTIPATION TYPE: Primary | ICD-10-CM

## 2021-10-21 DIAGNOSIS — N13.2 HYDRONEPHROSIS WITH URINARY OBSTRUCTION DUE TO URETERAL CALCULUS: ICD-10-CM

## 2021-10-21 LAB
ALBUMIN SERPL-MCNC: 4.1 G/DL (ref 3.5–5)
ALBUMIN/GLOB SERPL: 1.2 {RATIO} (ref 1.1–2.2)
ALP SERPL-CCNC: 94 U/L (ref 45–117)
ALT SERPL-CCNC: 39 U/L (ref 12–78)
ANION GAP SERPL CALC-SCNC: 11 MMOL/L (ref 5–15)
APPEARANCE UR: ABNORMAL
AST SERPL-CCNC: 23 U/L (ref 15–37)
BACTERIA URNS QL MICRO: NEGATIVE /HPF
BASOPHILS # BLD: 0 K/UL (ref 0–0.1)
BASOPHILS NFR BLD: 1 % (ref 0–1)
BILIRUB SERPL-MCNC: 0.5 MG/DL (ref 0.2–1)
BILIRUB UR QL: NEGATIVE
BUN SERPL-MCNC: 13 MG/DL (ref 6–20)
BUN/CREAT SERPL: 10 (ref 12–20)
CALCIUM SERPL-MCNC: 9.2 MG/DL (ref 8.5–10.1)
CHLORIDE SERPL-SCNC: 106 MMOL/L (ref 97–108)
CO2 SERPL-SCNC: 24 MMOL/L (ref 21–32)
COLOR UR: ABNORMAL
COMMENT, HOLDF: NORMAL
CREAT SERPL-MCNC: 1.28 MG/DL (ref 0.7–1.3)
DIFFERENTIAL METHOD BLD: NORMAL
EOSINOPHIL # BLD: 0.1 K/UL (ref 0–0.4)
EOSINOPHIL NFR BLD: 2 % (ref 0–7)
EPITH CASTS URNS QL MICRO: ABNORMAL /LPF
ERYTHROCYTE [DISTWIDTH] IN BLOOD BY AUTOMATED COUNT: 12.7 % (ref 11.5–14.5)
GLOBULIN SER CALC-MCNC: 3.5 G/DL (ref 2–4)
GLUCOSE SERPL-MCNC: 129 MG/DL (ref 65–100)
GLUCOSE UR STRIP.AUTO-MCNC: NEGATIVE MG/DL
HCT VFR BLD AUTO: 46 % (ref 36.6–50.3)
HGB BLD-MCNC: 15.9 G/DL (ref 12.1–17)
HGB UR QL STRIP: ABNORMAL
HYALINE CASTS URNS QL MICRO: ABNORMAL /LPF (ref 0–5)
IMM GRANULOCYTES # BLD AUTO: 0 K/UL (ref 0–0.04)
IMM GRANULOCYTES NFR BLD AUTO: 0 % (ref 0–0.5)
KETONES UR QL STRIP.AUTO: NEGATIVE MG/DL
LEUKOCYTE ESTERASE UR QL STRIP.AUTO: NEGATIVE
LIPASE SERPL-CCNC: 141 U/L (ref 73–393)
LYMPHOCYTES # BLD: 3.3 K/UL (ref 0.8–3.5)
LYMPHOCYTES NFR BLD: 41 % (ref 12–49)
MCH RBC QN AUTO: 30.9 PG (ref 26–34)
MCHC RBC AUTO-ENTMCNC: 34.6 G/DL (ref 30–36.5)
MCV RBC AUTO: 89.5 FL (ref 80–99)
MONOCYTES # BLD: 0.5 K/UL (ref 0–1)
MONOCYTES NFR BLD: 7 % (ref 5–13)
NEUTS SEG # BLD: 4.1 K/UL (ref 1.8–8)
NEUTS SEG NFR BLD: 49 % (ref 32–75)
NITRITE UR QL STRIP.AUTO: NEGATIVE
NRBC # BLD: 0 K/UL (ref 0–0.01)
NRBC BLD-RTO: 0 PER 100 WBC
PH UR STRIP: 8 [PH] (ref 5–8)
PLATELET # BLD AUTO: 341 K/UL (ref 150–400)
PMV BLD AUTO: 9 FL (ref 8.9–12.9)
POTASSIUM SERPL-SCNC: 3.4 MMOL/L (ref 3.5–5.1)
PROT SERPL-MCNC: 7.6 G/DL (ref 6.4–8.2)
PROT UR STRIP-MCNC: 30 MG/DL
RBC # BLD AUTO: 5.14 M/UL (ref 4.1–5.7)
RBC #/AREA URNS HPF: ABNORMAL /HPF (ref 0–5)
SAMPLES BEING HELD,HOLD: NORMAL
SODIUM SERPL-SCNC: 141 MMOL/L (ref 136–145)
SP GR UR REFRACTOMETRY: 1.02 (ref 1–1.03)
UR CULT HOLD, URHOLD: NORMAL
UROBILINOGEN UR QL STRIP.AUTO: 0.2 EU/DL (ref 0.2–1)
WBC # BLD AUTO: 8.1 K/UL (ref 4.1–11.1)
WBC URNS QL MICRO: ABNORMAL /HPF (ref 0–4)

## 2021-10-21 PROCEDURE — 74176 CT ABD & PELVIS W/O CONTRAST: CPT

## 2021-10-21 PROCEDURE — 96374 THER/PROPH/DIAG INJ IV PUSH: CPT

## 2021-10-21 PROCEDURE — 74011250636 HC RX REV CODE- 250/636: Performed by: EMERGENCY MEDICINE

## 2021-10-21 PROCEDURE — 81001 URINALYSIS AUTO W/SCOPE: CPT

## 2021-10-21 PROCEDURE — 99283 EMERGENCY DEPT VISIT LOW MDM: CPT

## 2021-10-21 PROCEDURE — 85025 COMPLETE CBC W/AUTO DIFF WBC: CPT

## 2021-10-21 PROCEDURE — 83690 ASSAY OF LIPASE: CPT

## 2021-10-21 PROCEDURE — 80053 COMPREHEN METABOLIC PANEL: CPT

## 2021-10-21 PROCEDURE — 74011250637 HC RX REV CODE- 250/637: Performed by: EMERGENCY MEDICINE

## 2021-10-21 PROCEDURE — 96375 TX/PRO/DX INJ NEW DRUG ADDON: CPT

## 2021-10-21 PROCEDURE — 36415 COLL VENOUS BLD VENIPUNCTURE: CPT

## 2021-10-21 RX ORDER — ONDANSETRON 2 MG/ML
4 INJECTION INTRAMUSCULAR; INTRAVENOUS
Status: COMPLETED | OUTPATIENT
Start: 2021-10-21 | End: 2021-10-21

## 2021-10-21 RX ORDER — MORPHINE SULFATE 2 MG/ML
2 INJECTION, SOLUTION INTRAMUSCULAR; INTRAVENOUS
Status: COMPLETED | OUTPATIENT
Start: 2021-10-21 | End: 2021-10-21

## 2021-10-21 RX ORDER — TAMSULOSIN HYDROCHLORIDE 0.4 MG/1
0.4 CAPSULE ORAL DAILY
Qty: 15 CAPSULE | Refills: 0 | Status: SHIPPED | OUTPATIENT
Start: 2021-10-21 | End: 2021-11-05

## 2021-10-21 RX ORDER — OXYCODONE AND ACETAMINOPHEN 5; 325 MG/1; MG/1
1 TABLET ORAL
Qty: 12 TABLET | Refills: 0 | Status: SHIPPED | OUTPATIENT
Start: 2021-10-21 | End: 2021-10-24

## 2021-10-21 RX ORDER — KETOROLAC TROMETHAMINE 30 MG/ML
30 INJECTION, SOLUTION INTRAMUSCULAR; INTRAVENOUS
Status: COMPLETED | OUTPATIENT
Start: 2021-10-21 | End: 2021-10-21

## 2021-10-21 RX ORDER — OXYCODONE AND ACETAMINOPHEN 5; 325 MG/1; MG/1
1 TABLET ORAL
Status: COMPLETED | OUTPATIENT
Start: 2021-10-21 | End: 2021-10-21

## 2021-10-21 RX ORDER — IBUPROFEN 800 MG/1
800 TABLET ORAL
Qty: 20 TABLET | Refills: 0 | Status: SHIPPED | OUTPATIENT
Start: 2021-10-21 | End: 2021-10-28

## 2021-10-21 RX ORDER — ONDANSETRON 4 MG/1
4 TABLET, ORALLY DISINTEGRATING ORAL
Qty: 15 TABLET | Refills: 0 | Status: SHIPPED | OUTPATIENT
Start: 2021-10-21

## 2021-10-21 RX ORDER — POLYETHYLENE GLYCOL 3350 17 G/17G
POWDER, FOR SOLUTION ORAL
Qty: 391 G | Refills: 0 | Status: SHIPPED | OUTPATIENT
Start: 2021-10-21 | End: 2021-11-02

## 2021-10-21 RX ADMIN — KETOROLAC TROMETHAMINE 30 MG: 30 INJECTION, SOLUTION INTRAMUSCULAR at 07:08

## 2021-10-21 RX ADMIN — ONDANSETRON 4 MG: 2 INJECTION INTRAMUSCULAR; INTRAVENOUS at 07:05

## 2021-10-21 RX ADMIN — MORPHINE SULFATE 2 MG: 2 INJECTION, SOLUTION INTRAMUSCULAR; INTRAVENOUS at 07:04

## 2021-10-21 RX ADMIN — OXYCODONE HYDROCHLORIDE AND ACETAMINOPHEN 1 TABLET: 5; 325 TABLET ORAL at 07:27

## 2021-10-21 NOTE — ED PROVIDER NOTES
25-year-old male presenting ER with complaint of right lower quadrant abdominal pain associate with nausea without vomiting. Patient reports he had pain yesterday at the same location with a constant sharp pain that lasts several hours and then was relieved. Was doing well until the pain returned again around 4 AM.  Patient reports pain is constant does not radiate. Nausea without vomiting. No diarrhea constipation patient is unsure if he has some urinary frequency but no pain with urination. No blood in the urine. No history of abdominal surgeries. No history of kidney stones. No pain in the testicles. No report of any fevers or chills decreased. + appetite. Past Medical History:   Diagnosis Date    ADHD (attention deficit hyperactivity disorder)     Anxiety     AR (allergic rhinitis)     Asthma     Depression     Headache     N&V (nausea and vomiting)     Poor appetite     Visual disturbance        No past surgical history on file.       Family History:   Problem Relation Age of Onset    Headache Maternal Aunt     Headache Maternal Grandmother     Heart Disease Maternal Grandmother     Stroke Maternal Grandmother     Cancer Maternal Grandfather     Dementia Other        Social History     Socioeconomic History    Marital status: UNKNOWN     Spouse name: Not on file    Number of children: Not on file    Years of education: Not on file    Highest education level: Not on file   Occupational History    Not on file   Tobacco Use    Smoking status: Never Smoker    Smokeless tobacco: Never Used   Substance and Sexual Activity    Alcohol use: No    Drug use: No    Sexual activity: Yes     Partners: Female     Birth control/protection: Condom   Other Topics Concern    Not on file   Social History Narrative    Not on file     Social Determinants of Health     Financial Resource Strain:     Difficulty of Paying Living Expenses:    Food Insecurity:     Worried About Running Out of Food in the Last Year:    [address] in the Last Year:    Transportation Needs:     Lack of Transportation (Medical):  Lack of Transportation (Non-Medical):    Physical Activity:     Days of Exercise per Week:     Minutes of Exercise per Session:    Stress:     Feeling of Stress :    Social Connections:     Frequency of Communication with Friends and Family:     Frequency of Social Gatherings with Friends and Family:     Attends Mu-ism Services:     Active Member of Clubs or Organizations:     Attends Club or Organization Meetings:     Marital Status:    Intimate Partner Violence:     Fear of Current or Ex-Partner:     Emotionally Abused:     Physically Abused:     Sexually Abused: ALLERGIES: Other medication and Zithromax [azithromycin]    Review of Systems   Constitutional: Positive for appetite change. Negative for chills and fever. HENT: Negative for congestion and sore throat. Eyes: Negative for pain. Respiratory: Negative for shortness of breath. Cardiovascular: Negative for chest pain. Gastrointestinal: Positive for abdominal pain and nausea. Negative for constipation, diarrhea and vomiting. Genitourinary: Negative for dysuria, flank pain, penile pain, penile swelling, scrotal swelling and testicular pain. Musculoskeletal: Negative for back pain and neck pain. Skin: Negative for rash. Neurological: Negative for dizziness and headaches. Vitals:    10/21/21 0627   BP: 136/78   Pulse: 88   Resp: 18   Temp: 97.8 °F (36.6 °C)   SpO2: 100%   Weight: 65.8 kg (145 lb)   Height: 5' 8\" (1.727 m)            Physical Exam  Constitutional:       Appearance: He is well-developed. HENT:      Head: Normocephalic. Eyes:      Conjunctiva/sclera: Conjunctivae normal.   Cardiovascular:      Rate and Rhythm: Normal rate and regular rhythm. Pulmonary:      Effort: Pulmonary effort is normal. No respiratory distress. Breath sounds: Normal breath sounds. Abdominal:      General: Bowel sounds are normal.      Palpations: Abdomen is soft. Tenderness: There is abdominal tenderness in the right lower quadrant. There is no right CVA tenderness, left CVA tenderness, guarding or rebound. Positive signs include McBurney's sign. Negative signs include Rosa's sign. Musculoskeletal:         General: Normal range of motion. Cervical back: Normal range of motion and neck supple. Skin:     General: Skin is warm. Capillary Refill: Capillary refill takes less than 2 seconds. Findings: No rash. Neurological:      Mental Status: He is alert and oriented to person, place, and time. Comments: No gross motor or sensory deficits          MDM  Number of Diagnoses or Management Options  Diagnosis management comments: Patient with pain in right lower quadrant. Spine McBurney's point tenderness. No guarding or rebound tenderness. Patient afebrile. Will get CBC CMP lipase urine and CAT scan. Pain control. CT scan shows kidney stone, but small in size, will likely pass. Treated symptomatically and had improvement in his symptoms. Recommended urology follow-up with PCP follow-up for further evaluation as needed. Rx pain and nausea medication and MiraLAX and Flomax.     Procedures

## 2021-10-21 NOTE — ED TRIAGE NOTES
Patient arrives to ED w/ RLQ abd pain starting yesterday morning. He states it went away, but then came back early this morning. Denies vomitting.

## 2022-11-21 ENCOUNTER — OFFICE VISIT (OUTPATIENT)
Dept: INTERNAL MEDICINE CLINIC | Age: 27
End: 2022-11-21
Payer: MEDICAID

## 2022-11-21 VITALS
BODY MASS INDEX: 21.22 KG/M2 | OXYGEN SATURATION: 100 % | TEMPERATURE: 97.7 F | DIASTOLIC BLOOD PRESSURE: 80 MMHG | SYSTOLIC BLOOD PRESSURE: 125 MMHG | HEART RATE: 88 BPM | RESPIRATION RATE: 18 BRPM | HEIGHT: 68 IN | WEIGHT: 140 LBS

## 2022-11-21 DIAGNOSIS — F43.22 ADJUSTMENT DISORDER WITH ANXIOUS MOOD: ICD-10-CM

## 2022-11-21 DIAGNOSIS — R39.12 WEAK URINARY STREAM: ICD-10-CM

## 2022-11-21 DIAGNOSIS — R30.0 DYSURIA: Primary | ICD-10-CM

## 2022-11-21 DIAGNOSIS — R39.198 ABNORMAL URINATION: ICD-10-CM

## 2022-11-21 PROCEDURE — 99214 OFFICE O/P EST MOD 30 MIN: CPT | Performed by: INTERNAL MEDICINE

## 2022-11-21 RX ORDER — TRAZODONE HYDROCHLORIDE 50 MG/1
TABLET ORAL
COMMUNITY
Start: 2022-11-17

## 2022-11-21 RX ORDER — ARIPIPRAZOLE 5 MG/1
TABLET ORAL
COMMUNITY
Start: 2022-11-17

## 2022-11-21 NOTE — PROGRESS NOTES
Batsheva Tuttle  Identified pt with two pt identifiers(name and ). Chief Complaint   Patient presents with    ED Follow-up     RM21// pt presenting today for follow-up from ED visit 10/21 for abdominal pain; dx with kidney stones is still having issue with urinating; was recently admitted to [facility] for emotional issues 10/13-10/16; is seeing Selam Hurtado with the [facility]          1. Have you been to the ER, urgent care clinic since your last visit? Hospitalized since your last visit? NO    2. Have you seen or consulted any other health care providers outside of the [address] [place] [address] since your last visit? Include any pap smears or colon screening. NO      Provider notified of reason for visit, vitals and flowsheets obtained on patients. Patient received paperwork for advance directive during previous visit but has not completed at this time     Reviewed record In preparation for visit, huddled with provider and have obtained necessary documentation      Health Maintenance Due   Topic    Hepatitis C Screening     Depression Screen     COVID-19 Vaccine (1)    Flu Vaccine (1)       Wt Readings from Last 3 Encounters:   22 140 lb (63.5 kg)   10/21/21 145 lb (65.8 kg)   10/13/20 183 lb 12.8 oz (83.4 kg)     Temp Readings from Last 3 Encounters:   22 97.7 °F (36.5 °C) (Oral)   10/21/21 97.8 °F (36.6 °C)   10/13/20 98.4 °F (36.9 °C) (Oral)     BP Readings from Last 3 Encounters:   22 125/80   10/21/21 136/78   10/13/20 127/84     Pulse Readings from Last 3 Encounters:   22 88   10/21/21 88   10/13/20 96     Vitals:    22 1105   BP: 125/80   Pulse: 88   Resp: 18   Temp: 97.7 °F (36.5 °C)   TempSrc: Oral   SpO2: 100%   Weight: 140 lb (63.5 kg)   Height: 5' 8\" (1.727 m)   PainSc:   0 - No pain         Learning Assessment:  :     No flowsheet data found.     Depression Screening:  :     3 most recent PHQ Screens 2022   Little interest or pleasure in doing things Several days   Feeling down, depressed, irritable, or hopeless Several days   Total Score PHQ 2 2       Fall Risk Assessment:  :     Fall Risk Assessment, last 12 mths 9/17/2019   Able to walk? Yes   Fall in past 12 months? No       Abuse Screening:  :     Abuse Screening Questionnaire 9/17/2019   Do you ever feel afraid of your partner? N   Are you in a relationship with someone who physically or mentally threatens you? N   Is it safe for you to go home? Y       ADL Screening:  :     No flowsheet data found. Medication reconciliation up to date and corrected with patient at this time.

## 2022-11-21 NOTE — PROGRESS NOTES
Harvey Grayson is a 32 y.o. male who presents today for ED Follow-up (RM21// pt presenting today for follow-up from ED visit 10/21 for abdominal pain; dx with kidney stones is still having issue with urinating; was recently admitted to [facility] for emotional issues 10/13-10/16; is seeing Walt Trent with [facility] /)  . He has a history of   Patient Active Problem List   Diagnosis Code    Migraine with aura, without mention of intractable migraine without mention of status migrainosus G43.109   . Today patient is here for follow-up. Urinary Concerns. Florestine Stephen in October of last year. Since having some dysuria and flow problems. Recently not very bad. These episodes come and go. No correlation with caffeine use or stress. May have some correlation with sexual activity. Denies any history of STDs. Recently less sexually active. Denies any blood. Has been treated for some urinary issues in the past.    Depression: Patient was hospitalized in October for 3 days at [facility]. He is currently seeing Dr. Walt Trent with the [facility]. Patient currently taking Abilify Strattera clonazepam as well as Prozac. Patient is also taking as needed hydroxyzine and trazodone at bedtime. Dr. Suyapa Proctor is his prescriber. Medication changes include abilify. Currently in partial hospitalization program.     ROS  Review of Systems   Constitutional:  Negative for chills, fever and weight loss. HENT:  Negative for congestion and sore throat. Eyes:  Negative for blurred vision, double vision and photophobia. Respiratory:  Negative for cough and shortness of breath. Cardiovascular:  Negative for chest pain, palpitations and leg swelling. Gastrointestinal:  Negative for abdominal pain, constipation, diarrhea, heartburn, nausea and vomiting. Genitourinary:  Positive for dysuria and urgency. Negative for frequency. Musculoskeletal:  Negative for joint pain and myalgias.    Skin:  Negative for rash.   Neurological: Negative. Negative for headaches. Endo/Heme/Allergies:  Does not bruise/bleed easily. Psychiatric/Behavioral:  Positive for depression. Negative for memory loss and suicidal ideas. The patient is nervous/anxious. Visit Vitals  /80 (BP 1 Location: Left upper arm, BP Patient Position: Sitting, BP Cuff Size: Adult)   Pulse 88   Temp 97.7 °F (36.5 °C) (Oral)   Resp 18   Ht 5' 8\" (1.727 m)   Wt 140 lb (63.5 kg)   SpO2 100%   BMI 21.29 kg/m²       Physical Exam  Constitutional:       Appearance: He is well-developed. He is not diaphoretic. HENT:      Head: Normocephalic and atraumatic. Eyes:      Pupils: Pupils are equal, round, and reactive to light. Cardiovascular:      Rate and Rhythm: Normal rate and regular rhythm. Heart sounds: No murmur heard. Pulmonary:      Effort: Pulmonary effort is normal. No respiratory distress. Breath sounds: Normal breath sounds. Abdominal:      General: Abdomen is flat. Palpations: Abdomen is soft. Comments: No CVAT   Musculoskeletal:         General: Normal range of motion. Skin:     General: Skin is warm and dry. Neurological:      Mental Status: He is alert and oriented to person, place, and time. Psychiatric:         Behavior: Behavior normal.         Current Outpatient Medications   Medication Sig    ARIPiprazole (ABILIFY) 5 mg tablet 1/2 take by mouth once daily    traZODone (DESYREL) 50 mg tablet take 1 take by mouth at bedtime if needed for insomnia    FLUoxetine (PROZAC) 40 mg capsule     hydrOXYzine HCl (ATARAX) 25 mg tablet take 1 tablet by mouth three times a day if needed for anxiety    atomoxetine (STRATTERA) 18 mg capsule Take 18 mg by mouth daily. clonazePAM (KlonoPIN) 0.5 mg tablet Take  by mouth nightly as needed. albuterol (PROVENTIL HFA, VENTOLIN HFA, PROAIR HFA) 90 mcg/actuation inhaler Take 1 Puff by inhalation every six (6) hours as needed for Wheezing.      No current facility-administered medications for this visit. Past Medical History:   Diagnosis Date    ADHD (attention deficit hyperactivity disorder)     Anxiety     AR (allergic rhinitis)     Asthma     Calculus of kidney October 20, 2021    Depression     Headache     N&V (nausea and vomiting)     Poor appetite     Visual disturbance       History reviewed. No pertinent surgical history. Social History     Tobacco Use    Smoking status: Never    Smokeless tobacco: Never    Tobacco comments: Only once every maybe three months as a hobby aspect   Substance Use Topics    Alcohol use: No      Family History   Problem Relation Age of Onset    Headache Maternal Aunt     Headache Maternal Grandmother     Heart Disease Maternal Grandmother     Stroke Maternal Grandmother     Cancer Maternal Grandfather     Dementia Other     Headache Mother     Migraines Mother     Psychiatric Disorder Mother         Allergies   Allergen Reactions    Other Medication Hives     Patient reports macrolides    Zithromax [Azithromycin] Rash        Assessment/Plan  Diagnoses and all orders for this visit:    1. Dysuria-acute on chronic. No signs of active kidney stone or flank pain. Patient reports that these episodes flareup for short period of time. We will get a urinalysis as well as check a PSA. We will check kidney function and electrolytes. We will get a postvoid residual ultrasound. We will screen GC chlamydia. If this persists could consider a trial of Flomax versus urological consult  -     METABOLIC PANEL, COMPREHENSIVE; Future  -     URINALYSIS W/MICROSCOPIC; Future  -     PSA SCREENING (SCREENING); Future  -     US PRE/POST VOID BLADDER; Future  -     Saravanan Andre / GC-AMPLIFIED; Future    2. Abnormal urination  -     METABOLIC PANEL, COMPREHENSIVE; Future  -     URINALYSIS W/MICROSCOPIC; Future  -     PSA SCREENING (SCREENING); Future  -     US PRE/POST VOID BLADDER; Future  -     Saravanan Andre / GC-AMPLIFIED; Future    3.  Weak urinary stream  -     METABOLIC PANEL, COMPREHENSIVE; Future  -     URINALYSIS W/MICROSCOPIC; Future  -     PSA SCREENING (SCREENING); Future  -     US PRE/POST VOID BLADDER; Future    4. Adjustment disorder with anxious mood-currently working aggressively with mental health provider. Acute stressors include end of a long-term relationship. Edwige Coleman MD  11/21/2022    This note was created with the help of speech recognition software Maggie Casiano) and may contain some 'sound alike' errors.

## 2022-11-22 LAB
ALBUMIN SERPL-MCNC: 4.2 G/DL (ref 3.5–5)
ALBUMIN/GLOB SERPL: 1.4 {RATIO} (ref 1.1–2.2)
ALP SERPL-CCNC: 73 U/L (ref 45–117)
ALT SERPL-CCNC: 57 U/L (ref 12–78)
ANION GAP SERPL CALC-SCNC: 5 MMOL/L (ref 5–15)
APPEARANCE UR: CLEAR
AST SERPL-CCNC: 17 U/L (ref 15–37)
BACTERIA URNS QL MICRO: NEGATIVE /HPF
BILIRUB SERPL-MCNC: 0.4 MG/DL (ref 0.2–1)
BILIRUB UR QL: NEGATIVE
BUN SERPL-MCNC: 12 MG/DL (ref 6–20)
BUN/CREAT SERPL: 15 (ref 12–20)
CALCIUM SERPL-MCNC: 9.1 MG/DL (ref 8.5–10.1)
CHLORIDE SERPL-SCNC: 105 MMOL/L (ref 97–108)
CO2 SERPL-SCNC: 30 MMOL/L (ref 21–32)
COLOR UR: NORMAL
CREAT SERPL-MCNC: 0.82 MG/DL (ref 0.7–1.3)
EPITH CASTS URNS QL MICRO: NORMAL /LPF
GLOBULIN SER CALC-MCNC: 2.9 G/DL (ref 2–4)
GLUCOSE SERPL-MCNC: 75 MG/DL (ref 65–100)
GLUCOSE UR STRIP.AUTO-MCNC: NEGATIVE MG/DL
HGB UR QL STRIP: NEGATIVE
HYALINE CASTS URNS QL MICRO: NORMAL /LPF (ref 0–5)
KETONES UR QL STRIP.AUTO: NEGATIVE MG/DL
LEUKOCYTE ESTERASE UR QL STRIP.AUTO: NEGATIVE
NITRITE UR QL STRIP.AUTO: NEGATIVE
PH UR STRIP: 7.5 [PH] (ref 5–8)
POTASSIUM SERPL-SCNC: 3.9 MMOL/L (ref 3.5–5.1)
PROT SERPL-MCNC: 7.1 G/DL (ref 6.4–8.2)
PROT UR STRIP-MCNC: NEGATIVE MG/DL
PSA SERPL-MCNC: 0.6 NG/ML (ref 0.01–4)
RBC #/AREA URNS HPF: NORMAL /HPF (ref 0–5)
SODIUM SERPL-SCNC: 140 MMOL/L (ref 136–145)
SP GR UR REFRACTOMETRY: 1.01 (ref 1–1.03)
UROBILINOGEN UR QL STRIP.AUTO: 0.2 EU/DL (ref 0.2–1)
WBC URNS QL MICRO: NORMAL /HPF (ref 0–4)

## 2022-11-25 LAB
C TRACH RRNA SPEC QL NAA+PROBE: NEGATIVE
N GONORRHOEA RRNA SPEC QL NAA+PROBE: NEGATIVE
SPECIMEN SOURCE: NORMAL

## 2023-09-06 ENCOUNTER — TELEPHONE (OUTPATIENT)
Age: 28
End: 2023-09-06

## 2023-09-06 NOTE — TELEPHONE ENCOUNTER
Patient needs any urgent appointment, he has blood in his urine, I did give the option of DC on Sept 11th, he requested to possibly see Dr Dayana Davis since he has seen her in the past.     Call [facility] [phone] [facility])

## 2023-09-07 NOTE — TELEPHONE ENCOUNTER
09/07/2023--I called and spoke with the patient and made appointment for today at 2:00 pm with . Patient verbalized understanding and had no further question's at that time.